# Patient Record
Sex: FEMALE | Race: WHITE
[De-identification: names, ages, dates, MRNs, and addresses within clinical notes are randomized per-mention and may not be internally consistent; named-entity substitution may affect disease eponyms.]

---

## 2017-12-09 ENCOUNTER — HOSPITAL ENCOUNTER (EMERGENCY)
Dept: HOSPITAL 10 - FTE | Age: 47
Discharge: HOME | End: 2017-12-09
Payer: COMMERCIAL

## 2017-12-09 VITALS
BODY MASS INDEX: 44.81 KG/M2 | WEIGHT: 268.96 LBS | BODY MASS INDEX: 44.81 KG/M2 | WEIGHT: 268.96 LBS | HEIGHT: 65 IN | HEIGHT: 65 IN

## 2017-12-09 DIAGNOSIS — H81.10: Primary | ICD-10-CM

## 2017-12-09 DIAGNOSIS — Z79.84: ICD-10-CM

## 2017-12-09 DIAGNOSIS — I10: ICD-10-CM

## 2017-12-09 DIAGNOSIS — Z79.82: ICD-10-CM

## 2017-12-09 DIAGNOSIS — E11.9: ICD-10-CM

## 2017-12-09 PROCEDURE — 99283 EMERGENCY DEPT VISIT LOW MDM: CPT

## 2017-12-09 NOTE — ERD
ER Documentation


Chief Complaint


Chief Complaint


vomiting and dizziness, headache  x 1 day , denies chest pain





HPI


47-year-old female, with history of depression, hypertension and diabetes, 

presents to the emergency department complaining of sudden onset of dizziness 

described as a feeling that things are moving around for 1 day.  She had one 

episode of positional vertigo 2 years ago that resolved spontaneously.  2 days 

prior to the dizziness she had diarrhea 4.  Currently she is complaining of 

mild headache.  Denies weakness, tingling, numbness, incontinence.  No 

treatment attempted at this time.  History provided by patient.





ROS


A 12-point review of systems was performed and negative other than presented in 

the history of present illness.


 


SYSTEMIC symptoms:  no fever, chills, no night sweats, no weight loss


EYE symptoms:   No blurred vision, no eye discharge


OTOLARYNGEAL symptoms:   No hearing loss. No ear pain, no sore throat


CARDIOVASCULAR symptoms:   No chest pain or discomfort, no palpitations.


PULMONARY symptoms:   No dyspnea, no cough, no wheezing.


GASTROINTESTINAL symptoms:   No abdominal pain, no nausea, no vomiting, no 

diarrhea


MUSCULOSKELETAL symptoms:   No arthralgias, no muscle aches.


NEUROLOGY symptoms: No confusion, no syncope, no numbness or tingling.


SKIN:  No rashes





Medications


Home Meds


Active Scripts


Baclofen* (Baclofen*) 10 Mg Tablet, 10 MG PO Q8 for MUSCLE SPASMS for 5 Days, #

15 TAB


   Prov:SHALINI RAO MD         12/9/17


Meclizine Hcl* (Antivert*) 12.5 Mg Tab, 12.5 MG PO Q6H Y for DIZZINESS, #20 TAB


   Prov:SHALINI RAO MD         12/9/17


Reported Medications


Citalopram Hydrobromide* (Citalopram Hydrobromide*) 40 Mg Tablet, 40 MG PO HS


   3/3/14


Ferrous Sulfate* (Ferrous Sulfate*) 325 Mg Tabec, 325 MG PO BID


   3/3/14


Omeprazole* (Omeprazole*) 20 Mg Capsule.dr, 20 MG PO DAILY


   3/3/14


Potassium Chloride* (Klor-Con*) 8 Meq Tablet.sa, 8 MEQ PO BID


   3/3/14


Glipizide* (Glucotrol*) 5 Mg Tablet, 5 MG PO DAILY


   3/3/14


Aspirin (Aspirin) 81 Mg Tablet.dr, 81 MG PO DAILY


   3/3/14


Hydrochlorothiazide* (Hydrochlorothiazide*) 25 Mg Tab, 25 MG PO DAILY, TAB


   3/3/14


Metformin Hcl (Glucophage) 500 Mg Tablet, 500 MG PO BID


   3/3/14


Benazepril Hcl* (Benazepril Hcl*) 10 Mg Tablet


   4/27/11


Aspirin Ec (Aspir 81) 81 Mg Tablet.


   4/27/11





Allergies


Allergies:  


Coded Allergies:  


     No Known Drug Allergies (Verified  Allergy, Mild, 3/3/14)





PMhx/Soc


Medical and Surgical Hx:  pt denies Surgical Hx


History of Surgery:  No


Anesthesia Reaction:  No


Hx Neurological Disorder:  No


Hx Respiratory Disorders:  No


Hx Cardiac Disorders:  No


Hx Psychiatric Problems:  No


Hx Miscellaneous Medical Probl:  Yes (HTN, DM, anxiety, anemia)


Hx Alcohol Use:  No


Hx Substance Use:  No


Hx Tobacco Use:  No


Smoking Status:  Never smoker





Physical Exam


Vitals





Vital Signs








  Date Time  Temp Pulse Resp B/P Pulse Ox O2 Delivery O2 Flow Rate FiO2


 


12/9/17 09:19 97.0 78 18 130/79 96   








Physical Exam


Patient is in no acute distress, vital signs stable. Alert and fully oriented. 


EYES: PERRLA, EOMI, Sclera and conjunctiva appear normal. 


EARS: Canals clear, tympanic membranes WNL


THROAT: Normal oropharynx. 


NECK: Supple, No lymphadenopathy. Full ROM without pain or tenderness.


HEART:  RRR, no rubs, murmurs, clicks or gallops.


LUNGS: Clear to auscultation.


ABDOMEN: Soft, non-tender without masses or hepatosplenomegaly.


EXTREMITIES: No edema bilaterally.


BACK: Full ROM, no deformity, normal back exam


NEURO: Cranial nerves grossly intact, no motor or sensory deficit





Procedures/MDM





47-year-old female, with history of depression, hypertension and diabetes 

presents complaining of 1 day with vertigo.


Vital signs stable, Physical exam unremarkable, neurovascular exam intact


Differential diagnosis include but not limited to: Mnire's disease, 

vestibular neuronitis, migraine, vertigo, side effects of the medications, 

dehydration. Low suspicion for meningitis, CNS tumor, stroke.


Procedure is physical examination and clinical presentation consistent most 

likely with positional vertigo.


During the ED course the patient remained stable, no new complaints. 


Results and clinical impression discussed with patient who agrees with 

management. The patient is stable to be treated outpatient and will be 

discharged home with a Rx for meclizine and baclofen some side effects of 

prescribed medications (headache, rash, nausea, vomiting, diarrhea, drowsiness, 

habituation, bleeding, hypertension, interactions with other medications) were 

reviewed.





The patient was instructed to follow up with the primary care provider in the 

next 48h.  If symptoms persist, worsen or new symptoms develop, then patient 

should return to the ED immediately.





Instructions explained and given directly by me to the patient in [place] with 

acknowledgment and demonstrated understanding.





Disclaimer: Inadvertent spelling and grammatical errors are likely due to EHR/

dictation software use and do not reflect on the overall quality of patient 

care. Also, please note that the electronic time recorded on this note does not 

necessarily reflect the actual time of the patient encounter.





Departure


Diagnosis:  


 Primary Impression:  


 Positional vertigo


Condition:  Stable





Additional Instructions:  


Muchas jose por [facility] para camarena servicio.





Esperamos que en camarena visita a la manisha de emergencia camarena problema medico haya sido 

solucionado y que se sienta mucho mejor. 





Para estar seguros que camarena mejoria sigue en proceso, le pedimos el favor de 

hacer demetrius everett de seguimiento medico con camarena doctor primario en los proximos 2-4 

rush.





Lleve con usted estos documentos y las medicinas recetadas.





Si cheyenne sintomas empeoran y no puede isabela a camarena doctor, por favor regrese a manisha 

de emergencia.





En raz que usted no tenga un mdico de atencin primaria:


Llame al mdico o clnica comunitaria de referencia que aparece abajo max 

las horas de consultorio para hacer demetrius everett para que le vean.





CLINICAS:


[facility]  [phone] [address] [facility]  [phone] [address] [facility] [phone] [address] [place] [phone]


[address] [facility]   [phone] [address] [facility] [phone] [address] SHALINI CHAVEZ MD Dec 9, 2017 10:11

## 2018-01-05 ENCOUNTER — HOSPITAL ENCOUNTER (EMERGENCY)
Age: 48
LOS: 1 days | Discharge: HOME | End: 2018-01-06

## 2018-01-05 ENCOUNTER — HOSPITAL ENCOUNTER (EMERGENCY)
Dept: HOSPITAL 91 - FTE | Age: 48
LOS: 1 days | Discharge: HOME | End: 2018-01-06
Payer: COMMERCIAL

## 2018-01-05 DIAGNOSIS — Z79.84: ICD-10-CM

## 2018-01-05 DIAGNOSIS — H60.91: Primary | ICD-10-CM

## 2018-01-05 DIAGNOSIS — Z79.82: ICD-10-CM

## 2018-01-05 DIAGNOSIS — E11.9: ICD-10-CM

## 2018-01-05 DIAGNOSIS — I10: ICD-10-CM

## 2018-01-05 PROCEDURE — 99284 EMERGENCY DEPT VISIT MOD MDM: CPT

## 2018-04-10 ENCOUNTER — HOSPITAL ENCOUNTER (EMERGENCY)
Dept: HOSPITAL 91 - FTE | Age: 48
LOS: 1 days | Discharge: HOME | End: 2018-04-11
Payer: COMMERCIAL

## 2018-04-10 ENCOUNTER — HOSPITAL ENCOUNTER (EMERGENCY)
Age: 48
LOS: 1 days | Discharge: HOME | End: 2018-04-11

## 2018-04-10 DIAGNOSIS — B36.9: Primary | ICD-10-CM

## 2018-04-10 DIAGNOSIS — I10: ICD-10-CM

## 2018-04-10 DIAGNOSIS — E11.9: ICD-10-CM

## 2018-04-10 DIAGNOSIS — Z79.84: ICD-10-CM

## 2018-04-10 DIAGNOSIS — Z79.82: ICD-10-CM

## 2018-04-10 PROCEDURE — 99283 EMERGENCY DEPT VISIT LOW MDM: CPT

## 2018-09-25 ENCOUNTER — HOSPITAL ENCOUNTER (EMERGENCY)
Age: 48
Discharge: HOME | End: 2018-09-25

## 2018-09-25 ENCOUNTER — HOSPITAL ENCOUNTER (EMERGENCY)
Dept: HOSPITAL 91 - FTE | Age: 48
Discharge: HOME | End: 2018-09-25
Payer: COMMERCIAL

## 2018-09-25 DIAGNOSIS — Z79.84: ICD-10-CM

## 2018-09-25 DIAGNOSIS — I10: ICD-10-CM

## 2018-09-25 DIAGNOSIS — Z79.82: ICD-10-CM

## 2018-09-25 DIAGNOSIS — R42: Primary | ICD-10-CM

## 2018-09-25 DIAGNOSIS — E11.65: ICD-10-CM

## 2018-09-25 LAB
ADD MAN DIFF?: NO
ADD UMIC: YES
ALANINE AMINOTRANSFERASE: 168 IU/L (ref 13–69)
ALBUMIN/GLOBULIN RATIO: 0.88
ALBUMIN: 4 G/DL (ref 3.3–4.9)
ALKALINE PHOSPHATASE: 107 IU/L (ref 42–121)
ANION GAP: 18 (ref 8–16)
ASPARTATE AMINO TRANSFERASE: 149 IU/L (ref 15–46)
BASOPHIL #: 0.1 10^3/UL (ref 0–0.1)
BASOPHILS %: 0.5 % (ref 0–2)
BILIRUBIN,DIRECT: 0 MG/DL (ref 0–0.2)
BILIRUBIN,TOTAL: 0.4 MG/DL (ref 0.2–1.3)
BLOOD UREA NITROGEN: 16 MG/DL (ref 7–20)
CALCIUM: 9.5 MG/DL (ref 8.4–10.2)
CARBON DIOXIDE: 24 MMOL/L (ref 21–31)
CHLORIDE: 102 MMOL/L (ref 97–110)
CREATININE: 0.76 MG/DL (ref 0.44–1)
EOSINOPHILS #: 0.5 10^3/UL (ref 0–0.5)
EOSINOPHILS %: 4.1 % (ref 0–7)
GLOBULIN: 4.5 G/DL (ref 1.3–3.2)
GLUCOSE: 259 MG/DL (ref 70–220)
HEMATOCRIT: 40.8 % (ref 37–47)
HEMOGLOBIN: 12.6 G/DL (ref 12–16)
IMMATURE GRANS #M: 0.03 10^3/UL (ref 0–0.03)
IMMATURE GRANS % (M): 0.3 % (ref 0–0.43)
LYMPHOCYTES #: 3.6 10^3/UL (ref 0.8–2.9)
LYMPHOCYTES %: 32.7 % (ref 15–51)
MEAN CORPUSCULAR HEMOGLOBIN: 26.8 PG (ref 29–33)
MEAN CORPUSCULAR HGB CONC: 30.9 G/DL (ref 32–37)
MEAN CORPUSCULAR VOLUME: 86.8 FL (ref 82–101)
MEAN PLATELET VOLUME: 10.8 FL (ref 7.4–10.4)
MONOCYTE #: 0.5 10^3/UL (ref 0.3–0.9)
MONOCYTES %: 4.7 % (ref 0–11)
NEUTROPHIL #: 6.4 10^3/UL (ref 1.6–7.5)
NEUTROPHILS %: 57.7 % (ref 39–77)
NUCLEATED RED BLOOD CELLS #: 0 10^3/UL (ref 0–0)
NUCLEATED RED BLOOD CELLS%: 0 /100WBC (ref 0–0)
PLATELET COUNT: 388 10^3/UL (ref 140–415)
POTASSIUM: 4.1 MMOL/L (ref 3.5–5.1)
RED BLOOD COUNT: 4.7 10^6/UL (ref 4.2–5.4)
RED CELL DISTRIBUTION WIDTH: 15.3 % (ref 11.5–14.5)
SODIUM: 140 MMOL/L (ref 135–144)
TOTAL PROTEIN: 8.5 G/DL (ref 6.1–8.1)
UR ASCORBIC ACID: NEGATIVE MG/DL
UR BILIRUBIN (DIP): NEGATIVE MG/DL
UR BLOOD (DIP): (no result) MG/DL
UR CLARITY: CLEAR
UR COLOR: YELLOW
UR GLUCOSE (DIP): NEGATIVE MG/DL
UR KETONES (DIP): NEGATIVE MG/DL
UR LEUKOCYTE ESTERASE (DIP): NEGATIVE LEU/UL
UR NITRITE (DIP): NEGATIVE MG/DL
UR PH (DIP): 5 (ref 5–9)
UR RBC: 1 /HPF (ref 0–5)
UR SPECIFIC GRAVITY (DIP): 1.02 (ref 1–1.03)
UR SQUAMOUS EPITHELIAL CELL: (no result) /HPF
UR TOTAL PROTEIN (DIP): NEGATIVE MG/DL
UR UROBILINOGEN (DIP): NEGATIVE MG/DL
UR WBC: 1 /HPF (ref 0–5)
URINE PH (DIP) POC: 6 (ref 5–8.5)
WHITE BLOOD COUNT: 11 10^3/UL (ref 4.8–10.8)

## 2018-09-25 PROCEDURE — 36415 COLL VENOUS BLD VENIPUNCTURE: CPT

## 2018-09-25 PROCEDURE — 85025 COMPLETE CBC W/AUTO DIFF WBC: CPT

## 2018-09-25 PROCEDURE — 96374 THER/PROPH/DIAG INJ IV PUSH: CPT

## 2018-09-25 PROCEDURE — 99284 EMERGENCY DEPT VISIT MOD MDM: CPT

## 2018-09-25 PROCEDURE — 80053 COMPREHEN METABOLIC PANEL: CPT

## 2018-09-25 PROCEDURE — 81003 URINALYSIS AUTO W/O SCOPE: CPT

## 2018-09-25 PROCEDURE — 81025 URINE PREGNANCY TEST: CPT

## 2018-09-25 PROCEDURE — 81001 URINALYSIS AUTO W/SCOPE: CPT

## 2018-09-25 RX ADMIN — PROCHLORPERAZINE EDISYLATE 1 MG: 5 INJECTION INTRAMUSCULAR; INTRAVENOUS at 10:03

## 2018-09-25 RX ADMIN — THIAMINE HYDROCHLORIDE 1 MLS/HR: 100 INJECTION, SOLUTION INTRAMUSCULAR; INTRAVENOUS at 10:03

## 2019-02-21 ENCOUNTER — HOSPITAL ENCOUNTER (EMERGENCY)
Dept: HOSPITAL 10 - FTE | Age: 49
Discharge: HOME | End: 2019-02-21
Payer: COMMERCIAL

## 2019-02-21 ENCOUNTER — HOSPITAL ENCOUNTER (EMERGENCY)
Dept: HOSPITAL 91 - FTE | Age: 49
Discharge: HOME | End: 2019-02-21
Payer: COMMERCIAL

## 2019-02-21 VITALS — HEART RATE: 85 BPM | DIASTOLIC BLOOD PRESSURE: 65 MMHG | SYSTOLIC BLOOD PRESSURE: 129 MMHG | RESPIRATION RATE: 19 BRPM

## 2019-02-21 VITALS
BODY MASS INDEX: 47.23 KG/M2 | WEIGHT: 266.54 LBS | HEIGHT: 63 IN | WEIGHT: 266.54 LBS | BODY MASS INDEX: 47.23 KG/M2 | HEIGHT: 63 IN

## 2019-02-21 DIAGNOSIS — E11.9: ICD-10-CM

## 2019-02-21 DIAGNOSIS — Z79.82: ICD-10-CM

## 2019-02-21 DIAGNOSIS — Z79.84: ICD-10-CM

## 2019-02-21 DIAGNOSIS — E66.01: ICD-10-CM

## 2019-02-21 DIAGNOSIS — I10: ICD-10-CM

## 2019-02-21 DIAGNOSIS — J11.1: Primary | ICD-10-CM

## 2019-02-21 LAB
ADD MAN DIFF?: NO
ANION GAP: 11 (ref 5–13)
ARTERIAL BASE EXCESS: -5.2 MMOL/L (ref -3–3)
ARTERIAL BLOOD GAS OXYGEN SAT: 93.1 MMHG (ref 95–98)
ARTERIAL COHB: 0 % (ref 0–3)
ARTERIAL FRACTION OF OXYHGB: 92.8 % (ref 93–99)
ARTERIAL HCO3: 19.8 MMOL/L (ref 22–26)
ARTERIAL METHB: 0.3 % (ref 0–1.5)
ARTERIAL PCO2: 37.1 MMHG (ref 35–45)
B-TYPE NATRIURETIC PEPTIDE: 27 PG/ML (ref 0–125)
BASOPHIL #: 0.1 10^3/UL (ref 0–0.1)
BASOPHILS %: 0.7 % (ref 0–2)
BLOOD UREA NITROGEN: 14 MG/DL (ref 7–20)
CALCIUM: 10.1 MG/DL (ref 8.4–10.2)
CARBON DIOXIDE: 27 MMOL/L (ref 21–31)
CHLORIDE: 103 MMOL/L (ref 97–110)
CREATININE: 0.63 MG/DL (ref 0.44–1)
EOSINOPHILS #: 0.5 10^3/UL (ref 0–0.5)
EOSINOPHILS %: 5.4 % (ref 0–7)
FIO2: 21 %
GLUCOSE: 166 MG/DL (ref 70–220)
HEMATOCRIT: 41.1 % (ref 37–47)
HEMOGLOBIN: 13.1 G/DL (ref 12–16)
IMMATURE GRANS #M: 0.03 10^3/UL (ref 0–0.03)
IMMATURE GRANS % (M): 0.3 % (ref 0–0.43)
LYMPHOCYTES #: 3 10^3/UL (ref 0.8–2.9)
LYMPHOCYTES %: 33.6 % (ref 15–51)
MEAN CORPUSCULAR HEMOGLOBIN: 27 PG (ref 29–33)
MEAN CORPUSCULAR HGB CONC: 31.9 G/DL (ref 32–37)
MEAN CORPUSCULAR VOLUME: 84.7 FL (ref 82–101)
MEAN PLATELET VOLUME: 10.9 FL (ref 7.4–10.4)
MODE: (no result)
MONOCYTE #: 0.9 10^3/UL (ref 0.3–0.9)
MONOCYTES %: 10 % (ref 0–11)
NEUTROPHIL #: 4.4 10^3/UL (ref 1.6–7.5)
NEUTROPHILS %: 50 % (ref 39–77)
NUCLEATED RED BLOOD CELLS #: 0 10^3/UL (ref 0–0)
NUCLEATED RED BLOOD CELLS%: 0 /100WBC (ref 0–0)
O2 A-A PPRESDIFF RESPIRATORY: 34.8 MMHG (ref 7–24)
PLATELET COUNT: 347 10^3/UL (ref 140–415)
POTASSIUM: 4 MMOL/L (ref 3.5–5.1)
RED BLOOD COUNT: 4.85 10^6/UL (ref 4.2–5.4)
RED CELL DISTRIBUTION WIDTH: 15.6 % (ref 11.5–14.5)
SODIUM: 141 MMOL/L (ref 135–144)
TROPONIN-I: < 0.012 NG/ML (ref 0–0.12)
URINE PH (DIP) POC: 5.5 (ref 5–8.5)
WHITE BLOOD COUNT: 8.9 10^3/UL (ref 4.8–10.8)

## 2019-02-21 PROCEDURE — 83880 ASSAY OF NATRIURETIC PEPTIDE: CPT

## 2019-02-21 PROCEDURE — 96366 THER/PROPH/DIAG IV INF ADDON: CPT

## 2019-02-21 PROCEDURE — 83605 ASSAY OF LACTIC ACID: CPT

## 2019-02-21 PROCEDURE — 87040 BLOOD CULTURE FOR BACTERIA: CPT

## 2019-02-21 PROCEDURE — 71045 X-RAY EXAM CHEST 1 VIEW: CPT

## 2019-02-21 PROCEDURE — 36600 WITHDRAWAL OF ARTERIAL BLOOD: CPT

## 2019-02-21 PROCEDURE — 96365 THER/PROPH/DIAG IV INF INIT: CPT

## 2019-02-21 PROCEDURE — 96375 TX/PRO/DX INJ NEW DRUG ADDON: CPT

## 2019-02-21 PROCEDURE — 85025 COMPLETE CBC W/AUTO DIFF WBC: CPT

## 2019-02-21 PROCEDURE — 80048 BASIC METABOLIC PNL TOTAL CA: CPT

## 2019-02-21 PROCEDURE — 82803 BLOOD GASES ANY COMBINATION: CPT

## 2019-02-21 PROCEDURE — 82962 GLUCOSE BLOOD TEST: CPT

## 2019-02-21 PROCEDURE — 36415 COLL VENOUS BLD VENIPUNCTURE: CPT

## 2019-02-21 PROCEDURE — 81003 URINALYSIS AUTO W/O SCOPE: CPT

## 2019-02-21 PROCEDURE — 93005 ELECTROCARDIOGRAM TRACING: CPT

## 2019-02-21 PROCEDURE — 71260 CT THORAX DX C+: CPT

## 2019-02-21 PROCEDURE — 81025 URINE PREGNANCY TEST: CPT

## 2019-02-21 PROCEDURE — 99285 EMERGENCY DEPT VISIT HI MDM: CPT

## 2019-02-21 PROCEDURE — 84484 ASSAY OF TROPONIN QUANT: CPT

## 2019-02-21 PROCEDURE — 87400 INFLUENZA A/B EACH AG IA: CPT

## 2019-02-21 PROCEDURE — 94664 DEMO&/EVAL PT USE INHALER: CPT

## 2019-02-21 RX ADMIN — CEFTRIAXONE 1 MLS/HR: 1 INJECTION, SOLUTION INTRAVENOUS at 09:23

## 2019-02-21 RX ADMIN — LORAZEPAM 1 MG: 2 INJECTION, SOLUTION INTRAMUSCULAR; INTRAVENOUS at 11:08

## 2019-02-21 RX ADMIN — IPRATROPIUM BROMIDE 1 MG: 0.5 SOLUTION RESPIRATORY (INHALATION) at 09:17

## 2019-02-21 RX ADMIN — THIAMINE HYDROCHLORIDE 1 ML: 100 INJECTION, SOLUTION INTRAMUSCULAR; INTRAVENOUS at 09:32

## 2019-02-21 RX ADMIN — IOHEXOL 1 ML/SEC: 350 INJECTION, SOLUTION INTRAVENOUS at 11:29

## 2019-02-21 RX ADMIN — LIDOCAINE HYDROCHLORIDE 1 MLS/HR: 10 INJECTION, SOLUTION EPIDURAL; INFILTRATION; INTRACAUDAL; PERINEURAL at 09:23

## 2019-02-21 RX ADMIN — AZITHROMYCIN 1 MG: 250 TABLET, FILM COATED ORAL at 09:23

## 2019-02-21 RX ADMIN — ALBUTEROL SULFATE 1 MG: 2.5 SOLUTION RESPIRATORY (INHALATION) at 09:18

## 2019-02-21 RX ADMIN — METHYLPREDNISOLONE SODIUM SUCCINATE 1 MG: 125 INJECTION, POWDER, FOR SOLUTION INTRAMUSCULAR; INTRAVENOUS at 09:23

## 2019-02-21 RX ADMIN — VASOPRESSIN 1 ML/SEC: 20 INJECTION, SOLUTION INTRAMUSCULAR; SUBCUTANEOUS at 11:28

## 2019-02-21 RX ADMIN — IOHEXOL 1 ML: 300 INJECTION, SOLUTION INTRAVENOUS at 11:29

## 2019-02-21 NOTE — ERD
ER Documentation


Chief Complaint


Chief Complaint





C/O COUGH, CHEST CONGESTION WITH FLU SYMPTOMS SINCE SATURDAY





HPI


Eulalia Kim is a 48-year-old  female, with history of diabetes, 


hypertension, morbid obesity, who presents to the emergency department, 


complaining of worsening of cough, chest congestion, runny nose and general 


malaise for 4 days.  The patient denies abdominal pain, no diarrhea, no rashes. 


She has been taking over-the-counter medication without improvement of the 


symptoms.  She denies fevers, no chills.





ROS


All systems reviewed and are negative except as per history of present illness.





Medications


Home Meds


Active Scripts


Lorazepam* (Ativan*) 0.5 Mg Tablet, 0.5 MG PO Q8H PRN for ANXIETY, #10 TAB


   Prov:SHALINI RAO MD         2/21/19


Albuterol Sulfate* (Proair HFA*) 8.5 Gm Hfa.aer.ad, 2 PUFF INH Q4H for cough, #1


INHALER


   Prov:SHALINI RAO MD         2/21/19


Amoxicillin* (Amoxicillin*) 500 Mg Cap, 500 MG PO TID for 7 Days, CAP


   Prov:SHALINI RAO MD         2/21/19


Azithromycin* (Zithromax*) 250 Mg Tablet, 250 MG PO .ZPACK AS DIRECTED, #6 TAB


   TAKE 500 MG (2 TABS) THE FIRST DAY THEN 250 MG (1 TAB) DAYS 2-5


   Prov:SHALINI RAO MD         2/21/19


Ondansetron Hcl* (Zofran*) 4 Mg Tablet, 4 MG PO Q6H for NAUSEA AND/OR VOMITING, 


#30 TAB


   Prov:FREDY ACEVEDO PA-C         9/25/18


Meclizine Hcl* (Antivert*) 12.5 Mg Tab, 12.5 MG PO Q6H PRN for DIZZINESS, #20 


TAB


   Prov:FREDY ACEVEDO PA-C         9/25/18


Ibuprofen* (Ibuprofen*) 600 Mg Tablet, 600 MG PO Q6 for 3 Days, TAB


   Prov:KUSHAL MURPHY         4/11/18


Hydrocodone/Acetaminophen (Norco 5-325 Tablet) 1 Each Tablet, 1 TAB PO Q6H PRN 


for PAIN, #20 TAB


   Prov:VINCE PENN PA-C         1/6/18


Neomycin/Polymyxin/Hydrocort* (Cortisporin* Otic) 10 Ml Susp, 4 DROP RIGHT EAR 


QID for 7 Days, EA


   Prov:VINCE PENN PA-C         1/6/18


Amoxicillin* (Amoxicillin*) 500 Mg Cap, 500 MG PO BID for 7 Days, CAP


   Prov:VINCE PENN PA-C         1/6/18


Baclofen* (Baclofen*) 10 Mg Tablet, 10 MG PO Q8 for MUSCLE SPASMS for 5 Days, 


#15 TAB


   Prov:SHALINI RAO MD         12/9/17


Meclizine Hcl* (Antivert*) 12.5 Mg Tab, 12.5 MG PO Q6H PRN for DIZZINESS, #20 


TAB


   Prov:SHALINI RAO MD         12/9/17


Reported Medications


Citalopram Hydrobromide* (Citalopram Hydrobromide*) 40 Mg Tablet, 40 MG PO HS


   3/3/14


Ferrous Sulfate* (Ferrous Sulfate*) 325 Mg Tabec, 325 MG PO BID


   3/3/14


Omeprazole* (Omeprazole*) 20 Mg Capsule.dr, 20 MG PO DAILY


   3/3/14


Potassium Chloride* (Klor-Con*) 8 Meq Tablet.sa, 8 MEQ PO BID


   3/3/14


Glipizide* (Glucotrol*) 5 Mg Tablet, 5 MG PO DAILY


   3/3/14


Aspirin Delayed Release (Aspirin Delayed Release) 81 Mg Tablet.dr, 81 MG PO D


AILY


   3/3/14


Hydrochlorothiazide* (Hydrochlorothiazide*) 25 Mg Tab, 25 MG PO DAILY, TAB


   3/3/14


Metformin Hcl (Glucophage) 500 Mg Tablet, 500 MG PO BID


   3/3/14


Benazepril Hcl* (Benazepril Hcl*) 10 Mg Tablet


   4/27/11


Aspirin Ec (Aspir 81) 81 Mg Tablet.


   4/27/11


[Hypertension]   No Conflict Check


   12/1/10





Allergies


Allergies:  


Coded Allergies:  


     No Known Drug Allergies (Verified  Allergy, Mild, 2/21/19)





PMhx/Soc


History of Surgery:  Yes (C SECTION )


Anesthesia Reaction:  No


Hx Neurological Disorder:  No


Hx Respiratory Disorders:  No


Hx Cardiac Disorders:  Yes (HTN )


Hx Psychiatric Problems:  Yes (DEPRESSION )


Hx Miscellaneous Medical Probl:  Yes ( DM, anxiety, anemia)


Hx Alcohol Use:  No


Hx Substance Use:  No


Hx Tobacco Use:  No


Smoking Status:  Never smoker





FmHx


Family History:  diabetes; 


   No coronary disease





Physical Exam


Vitals





Vital Signs


  Date      Temp  Pulse  Resp  B/P (MAP)   Pulse Ox  O2          O2 Flow    FiO2


Time                                                 Delivery    Rate


   2/21/19  98.1    106    24      145/80        98  Room Air


     11:00                          (101)


   2/21/19           90    18                    96                           21


     09:19


   2/21/19  97.8     81    18      136/92       100


     08:10                          (107)





Physical Exam


Const:   Mild distress due to cough


Head:   Atraumatic 


Eyes:    Normal Conjunctiva


ENT:    Normal External Ears, Nose and Mouth.


Neck:               Full range of motion. No meningismus.


Resp:   Decreased respiratory sounds with significant rhonchi to auscultation 


bilaterally


Cardio:   Regular rate and rhythm, no murmurs


Abd:    Soft, non tender, non distended. Normal bowel sounds


Skin:   No petechiae or rashes


Back:   No midline or flank tenderness


Ext:    No cyanosis, or edema


Neur:   Awake and alert


Psych:    Normal Mood and Affect


Result Diagram:  


2/21/19 0913 2/21/19 0913





Results 24 hrs





Laboratory Tests


Test
              2/21/19
09:13     2/21/19
10:23  2/21/19
10:25  2/21/19
10:26


White Blood         8.9 10^3/ul


Count


Red Blood Count    4.85 10^6/ul


Hemoglobin            13.1 g/dl


Hematocrit               41.1 %


Mean Corpuscular        84.7 fl


Volume


Mean Corpuscular        27.0 pg


Hemoglobin


Mean Corpuscular     31.9 g/dl 
  
                 
              



Hemoglobin
Jasmin


nt


Red Cell                 15.6 %


Distribution


Width


Platelet Count      347 10^3/UL


Mean Platelet           10.9 fl


Volume


Immature                0.300 %


Granulocytes %


Neutrophils %            50.0 %


Lymphocytes %            33.6 %


Monocytes %              10.0 %


Eosinophils %             5.4 %


Basophils %               0.7 %


Nucleated Red       0.0 /100WBC


Blood Cells %


Immature          0.030 10^3/ul


Granulocytes #


Neutrophils #       4.4 10^3/ul


Lymphocytes #       3.0 10^3/ul


Monocytes #         0.9 10^3/ul


Eosinophils #       0.5 10^3/ul


Basophils #         0.1 10^3/ul


Nucleated Red       0.0 10^3/ul


Blood Cells #


Sodium Level         141 mmol/L


Potassium Level      4.0 mmol/L


Chloride Level       103 mmol/L


Carbon Dioxide        27 mmol/L


Level


Anion Gap                    11


Blood Urea             14 mg/dl


Nitrogen


Creatinine           0.63 mg/dl


Est Glomerular    > 60 mL/min 
   
                 
              



Filtrat


Rate
mL/min


Glucose Level         166 mg/dl


POC Venous           2.8 mmol/L


Lactate


Calcium Level        10.1 mg/dl


Troponin I        < 0.012 ng/ml


B-Type                 27 PG/ML


Natriuretic


Peptide


Bedside Urine pH                              5.5


(LAB)


Bedside Urine                     Negative


Protein (LAB)


Bedside Urine                     Negative


Glucose (UA)


Bedside Urine                     Negative


Ketones (LAB)


Bedside Urine                     Negative


Blood


Bedside Urine                     Negative


Nitrite (LAB)


Bedside Urine     
               Negative 
        
              



Leukocyte
Estera


se (L


POC Beta HCG,                                       NEGATIVE


Qualitative


Bedside Glucose                                                       161 mg/dL


Test
              2/21/19
11:21     2/21/19
12:02  
              



POC Venous           3.8 mmol/L


Lactate


Blood Gas                         Blood arterial


Specimen Source


Arterial Blood    
               2/21/2019
12:35:  
              



Date Drawn
                                  25 PM


Arterial Blood    
                        7.346 
  
              



pH


(Temp
corrected)


Arterial Blood    
                    37.1 mmhg 
  
              



pCO2


(Temp
correct)


Arterial Blood    
                    70.5 mmHG 
  
              



pO2


(Temp
corrected)


Arterial Blood                        19.8 mmol/L


HCO3


Arterial Blood                        -5.2 mmol/L


Base Excess


Arterial Blood    
                    93.1 mmHG 
  
              



Oxygen
Saturatio


n


Ariel Test                        N/A


Arterial Blood    
               Right Brachial 
  
              



Gas


Puncture
Site


Arterial          
                          0 % 
  
              



Blood
Carboxyhem


oglobin


Arterial Blood                              0.3 %


Methemoglobin


Blood Gas A-a O2                        34.8 mmHg


Differential


Oxyhemoglobin                              92.8 %


Percent


Blood Gas                                  37.0 C


Temperature


Blood Gas                         ROOM AIR


Modality


FiO2                                       21.0 %


Blood Gas                         RAE RENEE


Notified Whom


Blood Gas         
               2/21/2019
12:49:  
              



Notified Time
                               12 PM





Current Medications


 Medications
   Dose
          Sig/Nicole
       Start Time
   Status  Last


 (Trade)       Ordered        Route
 PRN     Stop Time              Admin
Dose


                              Reason                                Admin


 Sodium         500 ml @ 
     Q1H STAT
      2/21/19       DC           2/21/19


Chloride       500 mls/hr     IV
            08:56
                       09:23



                                             2/21/19 09:55


                125 mg         ONCE  ONCE
    2/21/19       DC           2/21/19


Methylprednis                 IV
            09:00
                       09:23



olone
 Sodium                                2/21/19 09:11


Succinate



(Solu-Medrol)


 Albuterol
     5 mg           ONCE  STAT
    2/21/19       DC           2/21/19


(Proventil
                   HHN
           08:56
                       09:18



0.083% (Neb))                                2/21/19 09:11


 Ipratropium
   0.5 mg         ONCE  ONCE
    2/21/19       DC           2/21/19


Bromide
                      INH
           09:00
                       09:17



(Atrovent                                    2/21/19 09:11


0.02%



(Neb))


                500 mg         ONCE  STAT
    2/21/19       DC           2/21/19


Azithromycin
                 PO
            08:56
                       09:23



 (Zithromax)                                 2/21/19 09:11


 Ceftriaxone    50 ml @ 
      ONCE  STAT
    2/21/19       DC           2/21/19


Sodium         100 mls/hr     IVPB
          08:56
                       09:23



                                             2/21/19 09:25


 Sodium         3,630 ml       BOLUS OVER 2   2/21/19       DC           2/21/19


Chloride
                     HOURS STAT
    09:26
                       09:32



(NS)                          IV*
           2/21/19 09:27


 Lorazepam
     0.5 mg         ONCE  ONCE
    2/21/19       DC           2/21/19


(Ativan)                      IV
            11:30
                       11:08



                                             2/21/19 11:31


 Sodium         100 ml @ ud    STK-MED        2/21/19       DC           2/21/19


Chloride                      ONCE
 .ROUTE
  11:14
                       11:28



                                             2/21/19 11:15


 Iohexol
       150 ml         STK-MED        2/21/19       DC       



(Omnipaque                    ONCE
 .ROUTE
  11:14



300mg/
 ml)                                  2/21/19 11:15


 Iohexol        100 ml @ ud    STK-MED        2/21/19       DC           2/21/19


                              ONCE
 .ROUTE
  11:16
                       11:29



                                             2/21/19 11:17





                                                                                


           


                                    *** Microbiology ***                        


            


                                                                                


           


  INFLUENZA A & B BY EIA  Final  


        INFLU A&B BY EIA            INFLUENZA A NEGATIVE (Ref Range Neg)


                                    INFLUENZA B NEGATIVE (Ref Range Neg)





------------------------------------------------------------


--------------------------------


#1 9:20am


EKG read by me: 


Rate/Rhythm: Regular rate and rhythm at a rate of 79


Intervals: Normal


No acute ST changes.  No T wave inversion


Impression:     No evidence of acute ischemia or arrhythmia








#2 11:04am


Rate/Rhythm: Regular rate and rhythm at a rate of 100


Intervals: Normal


No acute ST changes.  No T wave inversion


Impression:     No acute changes consistent with acute ischemia or arrhythmia





Departure


Diagnosis:  


   Primary Impression:  


   Influenza-like illness


Condition:  Stable





Additional Instructions:  


Muchas jose por [facility] para camarena servicio.





Esperamos que en camarena visita a la manisha de emergencia camarena problema medico haya sido 


solucionado y que se sienta mucho mejor. 





Para estar seguros que camarena mejoria sigue en proceso, le pedimos el favor de hacer


 demetrius everett de seguimiento medico con camarena doctor primario en los proximos 2-4 rush.





Lleve con usted estos documentos y las medicinas recetadas.





Si cheyenne sintomas empeoran, NO SE ESPERE, por favor regrese a manisha de emergencia 


INMEDIATAMENTE.





En raz que usted no tenga un mdico de atencin primaria:


Llame al mdico o clnica comunitaria de referencia que aparece abajo max 


las horas de consultorio para hacer demetrius everett para que le vean.





CLINICAS:


[facility]  [phone] [address] [facility]  [phone] [address] [facility] [phone]


[address] [facility]  [phone]


[address] [facility]   [phone] [address] [facility] [facility]. [phone] 


[address] [place]      Feb 21, 2019 08:51